# Patient Record
Sex: FEMALE | Race: WHITE | NOT HISPANIC OR LATINO | URBAN - METROPOLITAN AREA
[De-identification: names, ages, dates, MRNs, and addresses within clinical notes are randomized per-mention and may not be internally consistent; named-entity substitution may affect disease eponyms.]

---

## 2022-01-03 ENCOUNTER — OFFICE VISIT (OUTPATIENT)
Dept: URBAN - METROPOLITAN AREA CLINIC 121 | Facility: CLINIC | Age: 62
End: 2022-01-03

## 2022-03-09 ENCOUNTER — OFFICE VISIT (OUTPATIENT)
Dept: URBAN - METROPOLITAN AREA CLINIC 57 | Facility: CLINIC | Age: 62
End: 2022-03-09

## 2022-04-18 ENCOUNTER — OFFICE VISIT (OUTPATIENT)
Dept: URBAN - METROPOLITAN AREA MEDICAL CENTER 7 | Facility: MEDICAL CENTER | Age: 62
End: 2022-04-18

## 2022-04-20 ENCOUNTER — OFFICE VISIT (OUTPATIENT)
Dept: URBAN - METROPOLITAN AREA MEDICAL CENTER 7 | Facility: MEDICAL CENTER | Age: 62
End: 2022-04-20

## 2022-07-09 ENCOUNTER — TELEPHONE ENCOUNTER (OUTPATIENT)
Dept: URBAN - METROPOLITAN AREA CLINIC 121 | Facility: CLINIC | Age: 62
End: 2022-07-09

## 2022-07-10 ENCOUNTER — TELEPHONE ENCOUNTER (OUTPATIENT)
Dept: URBAN - METROPOLITAN AREA CLINIC 121 | Facility: CLINIC | Age: 62
End: 2022-07-10

## 2022-07-10 RX ORDER — METOPROLOL SUCCINATE 100 MG/1
TABLET, EXTENDED RELEASE ORAL ONCE A DAY
Refills: 0 | Status: ACTIVE | COMMUNITY
Start: 2022-03-09

## 2022-07-10 RX ORDER — LISINOPRIL 40 MG/1
TABLET ORAL ONCE A DAY
Refills: 0 | Status: ACTIVE | COMMUNITY
Start: 2022-03-09

## 2022-07-10 RX ORDER — EVOLOCUMAB 140 MG/ML
INJECTION, SOLUTION SUBCUTANEOUS
Refills: 0 | Status: ACTIVE | COMMUNITY
Start: 2022-03-09

## 2022-07-10 RX ORDER — ONDANSETRON 4 MG/1
TAKE AS DIRECTED TAKE ONE TABLET 30 MINUTES PRIOR TO EACH PART OF COLONOSCOPY PREP TABLET, ORALLY DISINTEGRATING ORAL TAKE AS DIRECTED
Refills: 0 | Status: ACTIVE | COMMUNITY
Start: 2022-03-09

## 2023-12-12 ENCOUNTER — TELEPHONE ENCOUNTER (OUTPATIENT)
Dept: URBAN - METROPOLITAN AREA CLINIC 64 | Facility: CLINIC | Age: 63
End: 2023-12-12

## 2023-12-15 ENCOUNTER — OFFICE VISIT (OUTPATIENT)
Dept: URBAN - METROPOLITAN AREA CLINIC 63 | Facility: CLINIC | Age: 63
End: 2023-12-15

## 2023-12-15 NOTE — HPI-ZZZTODAY'S VISIT
Yessy is a very pleasant 63-year-old female who presents for acute abdominal pain with a history of diverticulosis.  She is established with Dr. Cheney last seen on 4/20/2022.  Past medical history significant for hypertension.  Past surgical history significant for orthopedic procedures.  Last colonoscopy 4/20/2022   With a recall of 3 years.  She is EGD naive. She denies a family history of colon polyps or GI malignancy.

## 2023-12-15 NOTE — HPI-PREVIOUS PROCEDURES
Colonoscopy 4/20/2022:Consistent with diverticulosis involving sigmoid rectosigmoid, hemorrhoids internal, polyp 10 mm in the ascending colon consistent with tubulovillous adenoma 4 mm polyp in transverse colon consistent with sessile serrated adenoma

## 2024-01-19 ENCOUNTER — OFFICE VISIT (OUTPATIENT)
Dept: URBAN - METROPOLITAN AREA CLINIC 57 | Facility: CLINIC | Age: 64
End: 2024-01-19

## 2024-04-09 PROBLEM — 70405009: Status: ACTIVE | Noted: 2024-04-09

## 2024-04-17 ENCOUNTER — OV EP (OUTPATIENT)
Dept: URBAN - METROPOLITAN AREA CLINIC 57 | Facility: CLINIC | Age: 64
End: 2024-04-17
Payer: COMMERCIAL

## 2024-04-17 VITALS
BODY MASS INDEX: 24.17 KG/M2 | HEIGHT: 67 IN | WEIGHT: 154 LBS | DIASTOLIC BLOOD PRESSURE: 90 MMHG | OXYGEN SATURATION: 98 % | SYSTOLIC BLOOD PRESSURE: 134 MMHG | HEART RATE: 87 BPM

## 2024-04-17 DIAGNOSIS — R19.4 CHANGE IN BOWEL HABITS: ICD-10-CM

## 2024-04-17 DIAGNOSIS — R19.5 CHANGE IN STOOL CALIBER: ICD-10-CM

## 2024-04-17 DIAGNOSIS — R13.10 DYSPHAGIA, UNSPECIFIED TYPE: ICD-10-CM

## 2024-04-17 DIAGNOSIS — K21.9 GASTROESOPHAGEAL REFLUX DISEASE, UNSPECIFIED WHETHER ESOPHAGITIS PRESENT: ICD-10-CM

## 2024-04-17 PROBLEM — 235595009: Status: ACTIVE | Noted: 2024-04-17

## 2024-04-17 PROBLEM — 40739000: Status: ACTIVE | Noted: 2024-04-17

## 2024-04-17 PROCEDURE — 99214 OFFICE O/P EST MOD 30 MIN: CPT

## 2024-04-17 RX ORDER — METOPROLOL SUCCINATE 50 MG/1
TAKE 2 TABLETS BY MOUTH EVERY DAY TABLET, EXTENDED RELEASE ORAL
Qty: 180 EACH | Refills: 1 | Status: ACTIVE | COMMUNITY

## 2024-04-17 RX ORDER — LISINOPRIL 40 MG/1
TAKE 1 TABLET BY MOUTH EVERY DAY TABLET ORAL
Qty: 30 EACH | Refills: 4 | Status: ACTIVE | COMMUNITY

## 2024-04-17 RX ORDER — BUDESONIDE AND FORMOTEROL FUMARATE DIHYDRATE 80; 4.5 UG/1; UG/1
AEROSOL RESPIRATORY (INHALATION)
Qty: 10.2 GRAM | Status: ACTIVE | COMMUNITY

## 2024-04-17 RX ORDER — FAMOTIDINE 40 MG/1
1 TABLET AT BEDTIME TABLET, FILM COATED ORAL ONCE A DAY
Qty: 90 TABLET | Refills: 0 | OUTPATIENT
Start: 2024-04-17

## 2024-04-17 RX ORDER — ONDANSETRON HYDROCHLORIDE 4 MG/1
1 TABLET TABLET, FILM COATED ORAL
Qty: 2 | Refills: 0 | OUTPATIENT
Start: 2024-04-17

## 2024-04-17 RX ORDER — EVOLOCUMAB 140 MG/ML
INJECT 140 MG SUBCUTANEOUSLY EVERY 2 WEEKS INJECTION, SOLUTION SUBCUTANEOUS
Qty: 2 MILLILITER | Refills: 4 | Status: ACTIVE | COMMUNITY

## 2024-04-17 NOTE — HPI-TODAY'S VISIT:
Yessy is a 64-year-old female presenting to the office today with complaints of left lower quadrant pain.  She called into the office earlier this month with complaints of left lower quadrant pain so CT scan was ordered that showed diverticulosis but no evidence of diverticulitis.  She states she has been experiencing multiple symptoms.  For the past month or so she has been much more constipated than she has in the past.  She states that last year she went to [place] and got constipated and has had intermittent constipation since then but this past month has been worse.  She also states that she has had thin stools multiple times, normally she will use the bathroom twice and the second time she has a bowel movement the stool will be "thin".  She also notes that she has had a really bad heartburn for the past few weeks.  She has had difficulty swallowing bread/chicken/rice and feels as if they are going down slow and will somewhat get stuck.  Her left lower quadrant pain has improved and she is no longer having any pain.  She has no other GI complaints, questions, concerns at this time.  She denies melena, hematochezia, bright red blood per rectum, nausea/vomiting, hematemesis, unintentional weight loss/weight gain.  Per patient she does not see a cardiologist or pulmonologist.  She denies a history of stroke, heart attack, pacemaker, defibrillator, stents, blood thinners, COPD, asthma, sleep apnea, chronic kidney disease, diabetes, seizures. . Colonoscopy 4/20/2022; - Diverticulosis mild in degree involving the sigmoid, rectosigmoid colon - Internal hemorrhoids - 10 mm polyp in the ascending colon.  Resected and retrieved. - 4 mm polyp in the transverse colon.  Resected and retrieved. - Pathology; - Ascending colon polypectomy; tubulovillous adenoma - Transverse colon polypectomy; sessile serrated adenoma - Repeat surveillance colonoscopy in 3 years . CT abdomen/pelvis 4/5/2024; - Impression; 1.  Sigmoid diverticulosis without evidence of diverticulitis. 2.  Small hiatal hernia 3.  1.1 cm saccular mid splenic artery aneurysm. 4.  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

## 2024-04-24 ENCOUNTER — LAB (OUTPATIENT)
Dept: URBAN - METROPOLITAN AREA CLINIC 57 | Facility: CLINIC | Age: 64
End: 2024-04-24

## 2024-05-08 ENCOUNTER — OFFICE VISIT (OUTPATIENT)
Dept: URBAN - METROPOLITAN AREA MEDICAL CENTER 7 | Facility: MEDICAL CENTER | Age: 64
End: 2024-05-08

## 2024-10-16 ENCOUNTER — LAB OUTSIDE AN ENCOUNTER (OUTPATIENT)
Dept: URBAN - METROPOLITAN AREA CLINIC 57 | Facility: CLINIC | Age: 64
End: 2024-10-16

## 2024-10-16 ENCOUNTER — TELEPHONE ENCOUNTER (OUTPATIENT)
Dept: URBAN - METROPOLITAN AREA CLINIC 57 | Facility: CLINIC | Age: 64
End: 2024-10-16

## 2024-10-30 ENCOUNTER — TELEPHONE ENCOUNTER (OUTPATIENT)
Dept: URBAN - METROPOLITAN AREA CLINIC 64 | Facility: CLINIC | Age: 64
End: 2024-10-30

## 2024-12-04 ENCOUNTER — TELEPHONE ENCOUNTER (OUTPATIENT)
Dept: URBAN - METROPOLITAN AREA CLINIC 57 | Facility: CLINIC | Age: 64
End: 2024-12-04

## 2024-12-06 ENCOUNTER — CLAIMS CREATED FROM THE CLAIM WINDOW (OUTPATIENT)
Dept: URBAN - METROPOLITAN AREA SURGERY CENTER 4 | Facility: SURGERY CENTER | Age: 64
End: 2024-12-06
Payer: COMMERCIAL

## 2024-12-06 DIAGNOSIS — K64.0 FIRST DEGREE HEMORRHOIDS: ICD-10-CM

## 2024-12-06 DIAGNOSIS — Z86.0100 PERSONAL HISTORY OF COLONIC POLYPS: ICD-10-CM

## 2024-12-06 DIAGNOSIS — K22.2 SCHATZKI'S RING: ICD-10-CM

## 2024-12-06 DIAGNOSIS — K57.30 DIVERTICULOSIS OF LARGE INTESTINE WITHOUT PERFORATION OR ABSCESS WITHOUT BLEEDING: ICD-10-CM

## 2024-12-06 DIAGNOSIS — K29.70 GASTRITIS WITHOUT BLEEDING, UNSPECIFIED CHRONICITY, UNSPECIFIED GASTRITIS TYPE: ICD-10-CM

## 2024-12-06 DIAGNOSIS — K20.0 EOSINOPHILIC ESOPHAGITIS: ICD-10-CM

## 2024-12-06 DIAGNOSIS — K29.70 CHRONIC GASTRITIS: ICD-10-CM

## 2024-12-06 DIAGNOSIS — K22.89 OTHER SPECIFIED DISEASE OF ESOPHAGUS: ICD-10-CM

## 2024-12-06 DIAGNOSIS — Z12.11 COLON CANCER SCREENING: ICD-10-CM

## 2024-12-06 DIAGNOSIS — Z86.0100 PERSONAL HISTORY OF COLON POLYPS, UNSPECIFIED: ICD-10-CM

## 2024-12-06 PROCEDURE — 00813 ANES UPR LWR GI NDSC PX: CPT | Performed by: NURSE ANESTHETIST, CERTIFIED REGISTERED

## 2024-12-06 PROCEDURE — 43239 EGD BIOPSY SINGLE/MULTIPLE: CPT | Performed by: INTERNAL MEDICINE

## 2024-12-06 PROCEDURE — 45378 DIAGNOSTIC COLONOSCOPY: CPT | Performed by: INTERNAL MEDICINE

## 2024-12-06 RX ORDER — BUDESONIDE AND FORMOTEROL FUMARATE DIHYDRATE 80; 4.5 UG/1; UG/1
AEROSOL RESPIRATORY (INHALATION)
Qty: 10.2 GRAM | Status: ACTIVE | COMMUNITY

## 2024-12-06 RX ORDER — FAMOTIDINE 40 MG/1
1 TABLET AT BEDTIME TABLET, FILM COATED ORAL ONCE A DAY
Qty: 90 TABLET | Refills: 0 | Status: ACTIVE | COMMUNITY
Start: 2024-04-17

## 2024-12-06 RX ORDER — METOPROLOL SUCCINATE 50 MG/1
TAKE 2 TABLETS BY MOUTH EVERY DAY TABLET, EXTENDED RELEASE ORAL
Qty: 180 EACH | Refills: 1 | Status: ACTIVE | COMMUNITY

## 2024-12-06 RX ORDER — ONDANSETRON HYDROCHLORIDE 4 MG/1
1 TABLET TABLET, FILM COATED ORAL
Qty: 2 | Refills: 0 | Status: ACTIVE | COMMUNITY
Start: 2024-04-17

## 2024-12-06 RX ORDER — LISINOPRIL 40 MG/1
TAKE 1 TABLET BY MOUTH EVERY DAY TABLET ORAL
Qty: 30 EACH | Refills: 4 | Status: ACTIVE | COMMUNITY

## 2024-12-06 RX ORDER — EVOLOCUMAB 140 MG/ML
INJECT 140 MG SUBCUTANEOUSLY EVERY 2 WEEKS INJECTION, SOLUTION SUBCUTANEOUS
Qty: 2 MILLILITER | Refills: 4 | Status: ACTIVE | COMMUNITY

## 2024-12-16 ENCOUNTER — TELEPHONE ENCOUNTER (OUTPATIENT)
Dept: URBAN - METROPOLITAN AREA CLINIC 57 | Facility: CLINIC | Age: 64
End: 2024-12-16

## 2024-12-20 ENCOUNTER — OFFICE VISIT (OUTPATIENT)
Dept: URBAN - METROPOLITAN AREA CLINIC 57 | Facility: CLINIC | Age: 64
End: 2024-12-20
Payer: COMMERCIAL

## 2024-12-20 ENCOUNTER — DASHBOARD ENCOUNTERS (OUTPATIENT)
Age: 64
End: 2024-12-20

## 2024-12-20 VITALS
RESPIRATION RATE: 12 BRPM | HEIGHT: 67 IN | BODY MASS INDEX: 24.17 KG/M2 | HEART RATE: 100 BPM | WEIGHT: 154 LBS | TEMPERATURE: 99.1 F

## 2024-12-20 DIAGNOSIS — Z12.11 COLON CANCER SCREENING: ICD-10-CM

## 2024-12-20 DIAGNOSIS — E65 PANNICULUS: ICD-10-CM

## 2024-12-20 DIAGNOSIS — R10.31 RIGHT LOWER QUADRANT PAIN: ICD-10-CM

## 2024-12-20 PROCEDURE — 99214 OFFICE O/P EST MOD 30 MIN: CPT

## 2024-12-20 RX ORDER — LISINOPRIL 40 MG/1
TAKE 1 TABLET BY MOUTH EVERY DAY TABLET ORAL
Qty: 30 EACH | Refills: 4 | Status: ACTIVE | COMMUNITY

## 2024-12-20 NOTE — HPI-TODAY'S VISIT:
Patient is a very pleasant 64-year-old female who presents for colonoscopy results and bowel changes.  She is a patient of Dr. Alvarez.  Last seen by Tima Hwang PA-C on 4/17/2024.  Past medical history significant for hypertension, splenic artery aneurysm, hiatal hernia, GERD, diverticulosis without diverticulitis.  Past surgical history reviewed. Last colonoscopy and endoscopy 12/6/2024.  Previously patient presented for chief complaint of change in bowel habits and increasing GERD symptoms.  For this reason colonoscopy and endoscopy were performed. Patient was also follow-up postprocedure.  She was referred back to our practice for hospital follow-up of abdominal pain. Patient presented to urgent care 12/2/2024 for right lower abdominal pain with alternating constipation and diarrhea.  Patient's blood pressure was 167/101.  Patient had ultrasound negative for appendicitis.  Upon being negative she is discharged to follow-up outpatient.  Patient presents for follow-up colonoscopy.  She relates that she tolerated procedure well.  She was feeling rundown about 5 days after her procedure for which she took her temperature.  It was between 99.1 and 100.2 Tmax.  Since then she has continued to feel rundown and have similar temperatures.  She has seen her PCP who did lab work.  She is otherwise asymptomatic.  She denies chills, nausea, vomiting, exposure to sick contacts, recent travel.  She was experiencing right lower quadrant pain prior to her colonoscopy for which she had an ultrasound.  Ultrasound was negative so CT was performed.  CT was consistent with panniculitis as  as well as other chronic findings.  She is curious if these have any implication on her temperature.  She no longer has this pain.  She denies dysphagia, dyspepsia, pyrosis, abdominal pain, unintentional weight loss, change in bowel habits, melena, hematochezia.   Previous procedures: 12/6/2024 colonoscopy consistent with internal hemorrhoids Diverticulosis No specimens collected Recall for 5 years  12/6/2024 endoscopy consistent with nonobstructing Schatzki's ring Esophageal mucosal changes suspicious for EOE Nonerosive gastritis with erythema Normal duodenum Duodenal biopsy consistent with intact villous architecture Stomach antrum biopsy with mild chronic gastritis negative for H. pylori GE junction with reflux esophagitis negative for intestinal metaplasia or dysplasia Distal esophageal biopsy with chronic gastritis negative for EOE Proximal esophageal biopsy negative for EOE  Previous labs: 12/13/2024 CMP significant for, dioxide 35, calcium 10.7, LFTs within normal limits, CBC within normal limits 12/2/2024 appendix ultrasound with nonvisualization of appendix and no signs of inflammation or right lower quadrant abnormality

## 2024-12-21 PROBLEM — 249535000: Status: ACTIVE | Noted: 2024-12-21

## 2024-12-24 ENCOUNTER — OFFICE VISIT (OUTPATIENT)
Dept: URBAN - METROPOLITAN AREA SURGERY CENTER 4 | Facility: SURGERY CENTER | Age: 64
End: 2024-12-24

## 2025-01-06 ENCOUNTER — OFFICE VISIT (OUTPATIENT)
Dept: URBAN - METROPOLITAN AREA CLINIC 57 | Facility: CLINIC | Age: 65
End: 2025-01-06

## 2025-03-24 ENCOUNTER — WEB ENCOUNTER (OUTPATIENT)
Dept: URBAN - METROPOLITAN AREA CLINIC 57 | Facility: CLINIC | Age: 65
End: 2025-03-24

## 2025-03-24 RX ORDER — DICYCLOMINE HYDROCHLORIDE 10 MG/1
2 CAPSULES CAPSULE ORAL THREE TIMES A DAY
Qty: 180 | OUTPATIENT
Start: 2025-03-26

## 2025-04-21 ENCOUNTER — ERX REFILL RESPONSE (OUTPATIENT)
Dept: URBAN - METROPOLITAN AREA CLINIC 57 | Facility: CLINIC | Age: 65
End: 2025-04-21

## 2025-04-21 RX ORDER — DICYCLOMINE HYDROCHLORIDE 10 MG/1
2 CAPSULES CAPSULE ORAL THREE TIMES A DAY
Qty: 180 | OUTPATIENT

## 2025-04-21 RX ORDER — DICYCLOMINE HYDROCHLORIDE 10 MG/1
TAKE 2 CAPSULES ORALLY THREE TIMES A DAY CAPSULE ORAL
Qty: 540 CAPSULE | Refills: 1 | OUTPATIENT

## 2025-05-15 ENCOUNTER — P2P PATIENT RECORD (OUTPATIENT)
Age: 65
End: 2025-05-15